# Patient Record
Sex: MALE | Race: WHITE | Employment: UNEMPLOYED | URBAN - NONMETROPOLITAN AREA
[De-identification: names, ages, dates, MRNs, and addresses within clinical notes are randomized per-mention and may not be internally consistent; named-entity substitution may affect disease eponyms.]

---

## 2019-04-22 ENCOUNTER — HOSPITAL ENCOUNTER (EMERGENCY)
Age: 54
Discharge: HOME | DRG: 342 | End: 2019-04-22
Payer: COMMERCIAL

## 2019-04-22 ENCOUNTER — APPOINTMENT (OUTPATIENT)
Dept: GENERAL RADIOLOGY | Age: 54
DRG: 342 | End: 2019-04-22
Payer: COMMERCIAL

## 2019-04-22 ENCOUNTER — HOSPITAL ENCOUNTER (INPATIENT)
Age: 54
LOS: 1 days | Discharge: HOME | DRG: 342 | End: 2019-04-23
Attending: EMERGENCY MEDICINE | Admitting: INTERNAL MEDICINE
Payer: COMMERCIAL

## 2019-04-22 ENCOUNTER — APPOINTMENT (OUTPATIENT)
Dept: CT IMAGING | Age: 54
DRG: 342 | End: 2019-04-22
Payer: COMMERCIAL

## 2019-04-22 VITALS
OXYGEN SATURATION: 95 % | BODY MASS INDEX: 43.23 KG/M2 | TEMPERATURE: 98.1 F | DIASTOLIC BLOOD PRESSURE: 72 MMHG | WEIGHT: 302 LBS | HEIGHT: 70 IN | SYSTOLIC BLOOD PRESSURE: 137 MMHG | HEART RATE: 105 BPM | RESPIRATION RATE: 22 BRPM

## 2019-04-22 DIAGNOSIS — R26.81 GAIT INSTABILITY: ICD-10-CM

## 2019-04-22 DIAGNOSIS — R46.0 POOR PERSONAL HYGIENE: ICD-10-CM

## 2019-04-22 DIAGNOSIS — S82.141A FRACTURE OF RIGHT TIBIAL PLATEAU, CLOSED, INITIAL ENCOUNTER: Primary | ICD-10-CM

## 2019-04-22 DIAGNOSIS — S82.141D CLOSED FRACTURE OF RIGHT TIBIAL PLATEAU WITH ROUTINE HEALING, SUBSEQUENT ENCOUNTER: Primary | ICD-10-CM

## 2019-04-22 PROBLEM — S82.143A TIBIAL PLATEAU FRACTURE, UNSPECIFIED LATERALITY, CLOSED, INITIAL ENCOUNTER: Status: ACTIVE | Noted: 2019-04-22

## 2019-04-22 LAB
A/G RATIO: 1.4 (ref 1.1–2.2)
ALBUMIN SERPL-MCNC: 4.1 G/DL (ref 3.4–5)
ALP BLD-CCNC: 79 U/L (ref 40–129)
ALT SERPL-CCNC: 22 U/L (ref 10–40)
ANION GAP SERPL CALCULATED.3IONS-SCNC: 13 MMOL/L (ref 3–16)
APTT: 29.6 SEC (ref 26–36)
AST SERPL-CCNC: 38 U/L (ref 15–37)
BASOPHILS ABSOLUTE: 0 K/UL (ref 0–0.2)
BASOPHILS RELATIVE PERCENT: 0.5 %
BILIRUB SERPL-MCNC: 0.8 MG/DL (ref 0–1)
BUN BLDV-MCNC: 25 MG/DL (ref 7–20)
CALCIUM SERPL-MCNC: 9.2 MG/DL (ref 8.3–10.6)
CARBAMAZEPINE DOSE: NORMAL
CARBAMAZEPINE LEVEL: 6.6 UG/ML (ref 4–12)
CHLORIDE BLD-SCNC: 96 MMOL/L (ref 99–110)
CO2: 25 MMOL/L (ref 21–32)
CREAT SERPL-MCNC: 1.1 MG/DL (ref 0.9–1.3)
EOSINOPHILS ABSOLUTE: 0 K/UL (ref 0–0.6)
EOSINOPHILS RELATIVE PERCENT: 0.4 %
GFR AFRICAN AMERICAN: >60
GFR NON-AFRICAN AMERICAN: >60
GLOBULIN: 3 G/DL
GLUCOSE BLD-MCNC: 113 MG/DL (ref 70–99)
HCT VFR BLD CALC: 45.4 % (ref 40.5–52.5)
HEMOGLOBIN: 15.3 G/DL (ref 13.5–17.5)
INR BLD: 1.09 (ref 0.86–1.14)
LYMPHOCYTES ABSOLUTE: 0.7 K/UL (ref 1–5.1)
LYMPHOCYTES RELATIVE PERCENT: 7.3 %
MCH RBC QN AUTO: 29.7 PG (ref 26–34)
MCHC RBC AUTO-ENTMCNC: 33.6 G/DL (ref 31–36)
MCV RBC AUTO: 88.4 FL (ref 80–100)
MONOCYTES ABSOLUTE: 0.9 K/UL (ref 0–1.3)
MONOCYTES RELATIVE PERCENT: 9.9 %
NEUTROPHILS ABSOLUTE: 7.6 K/UL (ref 1.7–7.7)
NEUTROPHILS RELATIVE PERCENT: 81.9 %
PDW BLD-RTO: 13.4 % (ref 12.4–15.4)
PLATELET # BLD: 226 K/UL (ref 135–450)
PMV BLD AUTO: 7.7 FL (ref 5–10.5)
POTASSIUM SERPL-SCNC: 3.8 MMOL/L (ref 3.5–5.1)
PROTHROMBIN TIME: 12.4 SEC (ref 9.8–13)
RBC # BLD: 5.14 M/UL (ref 4.2–5.9)
SODIUM BLD-SCNC: 134 MMOL/L (ref 136–145)
TOTAL PROTEIN: 7.1 G/DL (ref 6.4–8.2)
WBC # BLD: 9.3 K/UL (ref 4–11)

## 2019-04-22 PROCEDURE — 85025 COMPLETE CBC W/AUTO DIFF WBC: CPT

## 2019-04-22 PROCEDURE — 86900 BLOOD TYPING SEROLOGIC ABO: CPT

## 2019-04-22 PROCEDURE — 80053 COMPREHEN METABOLIC PANEL: CPT

## 2019-04-22 PROCEDURE — 85610 PROTHROMBIN TIME: CPT

## 2019-04-22 PROCEDURE — 80156 ASSAY CARBAMAZEPINE TOTAL: CPT

## 2019-04-22 PROCEDURE — 1200000000 HC SEMI PRIVATE

## 2019-04-22 PROCEDURE — L1830 KO IMMOB CANVAS LONG PRE OTS: HCPCS

## 2019-04-22 PROCEDURE — 99285 EMERGENCY DEPT VISIT HI MDM: CPT

## 2019-04-22 PROCEDURE — 6370000000 HC RX 637 (ALT 250 FOR IP): Performed by: PHYSICIAN ASSISTANT

## 2019-04-22 PROCEDURE — 73700 CT LOWER EXTREMITY W/O DYE: CPT

## 2019-04-22 PROCEDURE — 86850 RBC ANTIBODY SCREEN: CPT

## 2019-04-22 PROCEDURE — 99284 EMERGENCY DEPT VISIT MOD MDM: CPT

## 2019-04-22 PROCEDURE — 85730 THROMBOPLASTIN TIME PARTIAL: CPT

## 2019-04-22 PROCEDURE — 71045 X-RAY EXAM CHEST 1 VIEW: CPT

## 2019-04-22 PROCEDURE — 93005 ELECTROCARDIOGRAM TRACING: CPT | Performed by: PHYSICIAN ASSISTANT

## 2019-04-22 PROCEDURE — 73560 X-RAY EXAM OF KNEE 1 OR 2: CPT

## 2019-04-22 PROCEDURE — 86901 BLOOD TYPING SEROLOGIC RH(D): CPT

## 2019-04-22 RX ORDER — HYDROCODONE BITARTRATE AND ACETAMINOPHEN 5; 325 MG/1; MG/1
1 TABLET ORAL EVERY 6 HOURS PRN
Qty: 20 TABLET | Refills: 0 | Status: SHIPPED | OUTPATIENT
Start: 2019-04-22 | End: 2019-04-27

## 2019-04-22 RX ORDER — CARBAMAZEPINE 200 MG/1
200 TABLET ORAL ONCE
Status: COMPLETED | OUTPATIENT
Start: 2019-04-22 | End: 2019-04-23

## 2019-04-22 RX ORDER — ARIPIPRAZOLE 15 MG/1
15 TABLET ORAL DAILY
Status: DISCONTINUED | OUTPATIENT
Start: 2019-04-23 | End: 2019-04-23 | Stop reason: HOSPADM

## 2019-04-22 RX ORDER — CITALOPRAM 20 MG/1
20 TABLET ORAL ONCE
Status: COMPLETED | OUTPATIENT
Start: 2019-04-22 | End: 2019-04-23

## 2019-04-22 RX ORDER — LAMOTRIGINE 100 MG/1
200 TABLET ORAL DAILY
Status: DISCONTINUED | OUTPATIENT
Start: 2019-04-23 | End: 2019-04-23 | Stop reason: HOSPADM

## 2019-04-22 RX ORDER — HYDROCODONE BITARTRATE AND ACETAMINOPHEN 5; 325 MG/1; MG/1
1 TABLET ORAL ONCE
Status: COMPLETED | OUTPATIENT
Start: 2019-04-22 | End: 2019-04-22

## 2019-04-22 RX ADMIN — HYDROCODONE BITARTRATE AND ACETAMINOPHEN 1 TABLET: 5; 325 TABLET ORAL at 14:33

## 2019-04-22 ASSESSMENT — PAIN DESCRIPTION - FREQUENCY: FREQUENCY: INTERMITTENT

## 2019-04-22 ASSESSMENT — PAIN DESCRIPTION - LOCATION: LOCATION: KNEE

## 2019-04-22 ASSESSMENT — PAIN DESCRIPTION - ONSET: ONSET: SUDDEN

## 2019-04-22 ASSESSMENT — PAIN DESCRIPTION - PAIN TYPE: TYPE: ACUTE PAIN

## 2019-04-22 ASSESSMENT — ENCOUNTER SYMPTOMS
SHORTNESS OF BREATH: 0
COLOR CHANGE: 0

## 2019-04-22 ASSESSMENT — PAIN DESCRIPTION - ORIENTATION: ORIENTATION: RIGHT

## 2019-04-22 ASSESSMENT — PAIN SCALES - GENERAL
PAINLEVEL_OUTOF10: 10
PAINLEVEL_OUTOF10: 0

## 2019-04-22 NOTE — ED PROVIDER NOTES
Evaluated by FLORY supervising physician available for consult    Patient states he got up from bed Saturday morning 2 days ago stood up and twisted around and felt a pop in his right knee and then his knee gave out on him and he fell on the floor. States no other injuries from the fall did not hit his head. No loss of consciousness. States he has been trying to get around at home but putting pressure on his right knee causes pain and then it gives out on him a couple more times. Taking ibuprofen at home. States if he stays still he has no pain at all in knee only if he goes to move it or bearing weight causes pain. The history is provided by the patient. Knee Problem   Location:  Knee  Time since incident:  2 days  Injury: yes    Knee location:  R knee  Pain details:     Onset quality:  Sudden  Chronicity:  New  Relieved by:  Rest  Worsened by: Activity, bearing weight and flexion  Associated symptoms: no fever, no neck pain and no numbness        Review of Systems   Constitutional: Negative for fever. Respiratory: Negative for shortness of breath. Cardiovascular: Negative for chest pain. Musculoskeletal: Negative for neck pain. Rt knee pain   Skin: Negative for color change and wound. Neurological: Negative for dizziness, syncope, numbness and headaches. PAST MEDICAL HISTORY   has a past medical history of Allergic rhinitis and Depression. PAST SURGICAL HISTORY   has a past surgical history that includes Tonsillectomy. FAMILY HISTORY  family history includes Cancer in his mother; Heart Disease in his father and mother. SOCIAL HISTORY   reports that he has never smoked. His smokeless tobacco use includes snuff. He reports that he does not drink alcohol or use drugs. HOME MEDICATIONS     Prior to Admission medications    Medication Sig Start Date End Date Taking?  Authorizing Provider   CarBAMazepine (TEGRETOL PO) Take 800 mg by mouth   Yes Historical Provider, MD   citalopram (CELEXA) 20 MG tablet Take 20 mg by mouth daily   Yes Historical Provider, MD   ARIPiprazole (ABILIFY) 30 MG tablet Take 30 mg by mouth daily   Yes Historical Provider, MD   methylphenidate (RITALIN) 10 MG tablet Take 10 mg by mouth 3 times daily   Yes Historical Provider, MD   lamoTRIgine (LAMICTAL) 200 MG tablet Take 200 mg by mouth daily   Yes Historical Provider, MD        ALLERGIES  has No Known Allergies. /72   Pulse 105   Temp 98.1 °F (36.7 °C) (Oral)   Resp 22   Ht 5' 10\" (1.778 m)   Wt (!) 302 lb (137 kg)   SpO2 90%   BMI 43.33 kg/m²     Physical Exam   Constitutional: He is oriented to person, place, and time. He appears well-developed and well-nourished. Non-toxic appearance. He does not have a sickly appearance. He does not appear ill. HENT:   Head: Normocephalic and atraumatic. Cardiovascular: Normal rate, regular rhythm, normal heart sounds and intact distal pulses. Pulses:       Popliteal pulses are 2+ on the right side. Dorsalis pedis pulses are 2+ on the right side. Posterior tibial pulses are 2+ on the right side. Pulmonary/Chest: Effort normal and breath sounds normal. No stridor. No respiratory distress. He has no wheezes. He has no rales. Musculoskeletal:        Right hip: He exhibits no tenderness. Right knee: He exhibits decreased range of motion. He exhibits no ecchymosis, no deformity, no erythema, normal alignment and normal patellar mobility. Tenderness found. Medial joint line tenderness noted. Right ankle: He exhibits normal range of motion, no swelling, no ecchymosis, no deformity and normal pulse. No tenderness. Legs:       Right foot: There is normal range of motion, no tenderness, no swelling, normal capillary refill and no crepitus. Neurological: He is alert and oriented to person, place, and time. No sensory deficit. He exhibits normal muscle tone. Skin: Skin is warm and dry.    Psychiatric: He has a normal mood and affect. His behavior is normal.   Vitals reviewed. Procedures    MDM  Number of Diagnoses or Management Options  Fracture of right tibial plateau, closed, initial encounter:      Amount and/or Complexity of Data Reviewed  Tests in the radiology section of CPT®: ordered and reviewed  Independent visualization of images, tracings, or specimens: yes    Patient Progress  Patient progress: stable    Xr Knee Right (1-2 Views)    Result Date: 4/22/2019  EXAMINATION: 2 XRAY VIEWS OF THE RIGHT KNEE 4/22/2019 2:14 pm COMPARISON: None. HISTORY: ORDERING SYSTEM PROVIDED HISTORY: injury, pain TECHNOLOGIST PROVIDED HISTORY: Reason for exam:->injury, pain Ordering Physician Provided Reason for Exam: right knee pain after feeling pop in knee on saturday, no known injury Acuity: Acute Type of Exam: Initial Relevant Medical/Surgical History: best images due to pt ability to maneuver, multi attempts made for lateral image FINDINGS: There is an irregular lucent fracture line through the tibial plateau and proximal tibia. The fracture line is seen to extend into the intercondylar eminence. Findings are most compatible with a minimally displaced tibial plateau fracture. There is mild medial compartment joint space narrowing. No sizeable suprapatellar knee joint effusion is noted. There are no radiopaque foreign bodies. Radiographic findings most compatible with a minimally displaced tibial plateau fracture as described in body of report. Recommend further evaluation via dedicated noncontrast CT of the knee. Ct Knee Right Wo Contrast    Result Date: 4/22/2019  EXAMINATION: CT OF THE RIGHT KNEE WITHOUT CONTRAST 4/22/2019 3:15 pm TECHNIQUE: CT of the right knee was performed without the administration of intravenous contrast.  Multiplanar reformatted images are provided for review.  Dose modulation, iterative reconstruction, and/or weight based adjustment of the mA/kV was utilized to reduce the radiation dose to as low as reasonably achievable. COMPARISON: Right knee plain radiographs performed on 4/22/2019 HISTORY ORDERING SYSTEM PROVIDED HISTORY: FRACTURE, KNEE TECHNOLOGIST PROVIDED HISTORY: Ordering Physician Provided Reason for Exam: RT KNEE PAIN AFTER HEARING A POP, NO KNOWN INJURY, UNABLE TO WALK SINCE INCIDENT ON SATURDAY Acuity: Acute Type of Exam: Initial 67-year-old male who complains of right knee pain after hearing a pop; inability to walk since Saturday FINDINGS: Bones: There are vertically oriented fractures extending through the central tibial plateau/tibial spine. Comminuted, mildly depressed, intra-articular fracture through the posterior aspect of the lateral tibial plateau on image 35, series 602, with articular offset measuring up to 4 mm on image 34, series 602. This is also appreciated on image 55, series 601. These findings are also well appreciated on axial images 65-76, series 3. Proximal fibula appears grossly intact. Distal femur and patella appear intact. Mild enthesopathy at the upper and lower poles of the patella. Tiny bone islands within the medial femoral condyle. Soft Tissue: Atherosclerotic calcification of the vasculature. Moderate circumferential edema/fluid is seen within the subcutaneous fat about the right knee. Joint:  Small suprapatellar joint effusion/lipohemarthrosis. 1. Acute comminuted, vertically oriented fractures extending through the central tibial plateau and tibial spine. Comminuted, mildly depressed, intra-articular fracture through the posterolateral tibial plateau with articular offset measuring up to 4 mm. 2. Small suprapatellar joint effusion/lipohemarthrosis. 3. Atherosclerotic calcification of the vasculature. 4. Moderate edema in the subcutaneous fat about the right knee. 3:51 PM  Patient has acute comminuted tibial plateau fractures, mildly depressed, intra-articular posterior lateral tibial plateau fracture offset 4 mm.  He is neurovascularly intact and only mild swelling on exam. Plan to discharge home with knee immobilizer, crutches, non weight bearing, pain medication and follow up with orthopedics in office. I consulted Dr Harmony Munoz orthopedics on call who has reviewed films and he is in agreement with treatment plan as long as patient remains non weight bearing. I again emphasized nonweightbearing with the patient and discussed if he does not think he can remain nonweightbearing we can bring him into the hospital for management, patient declines admission, states he wants to go home, states he has no problem with non-weight bearing at home. Patient will call orthopedic office in the morning to arrange appointment to be seen this week. Advised keeping the leg elevated, ice packs and rest.  We discussed signs and symptoms of compartment syndrome and reasons to return to the ER for worsening pain, increased swelling, any numbness or discoloration returning immediately to the ER he understands and agrees. I estimate there is LOW risk for COMPARTMENT SYNDROME, TENDON OR NEUROVASCULAR INJURY, thus I consider the discharge disposition reasonable. Please note that this chart was generated using Dragon dictation software.  Although every effort was made to ensure the accuracy of this automated transcription, some errors in transcription may have occurred         Alpesh Mcdaniel PA-C  04/22/19 2596

## 2019-04-22 NOTE — ED NOTES
Pt DC home in good condition with RX x__1__ with knee immobilizer and crutches and follow up with ortho in the AM. V/u of Dc instructions. Denies questions or concerns. Teaching done re: s/s to report.      Ramona Christopher RN  04/22/19 1680

## 2019-04-22 NOTE — PROGRESS NOTES
Patient needs close follow-up, I have referred the patient to Dr. Parveen Juarez we will see the patient in the day or 2 for repeat clinical exam and x-rays. Tibial plateau, spine fracture. Previous patient of Dr. Parveen Juarez.

## 2019-04-22 NOTE — ED NOTES
Pain to R knee, no obvious deformities, circs intact and pulses present distal to site. Pt alert, without distress, given something to drink, call light within reach, will cot to monitor.       Mariano Wagoner RN  04/22/19 2129

## 2019-04-22 NOTE — ED NOTES
RN told me patient had difficulty when they tried him using crutches, I went back in the room and spoke with him to make sure he will be able to manage at home, he states it will just take some getting used to with crutches but assures me he will be able to use them at home and he again stated he does not want to be admitted to the hospital, he wants to go home and states he will not have any problem keeping his weight off his leg.       Sera Diaz PA-C  04/22/19 7414

## 2019-04-23 ENCOUNTER — COMMUNITY OUTREACH (OUTPATIENT)
Dept: OTHER | Age: 54
End: 2019-04-23

## 2019-04-23 VITALS
WEIGHT: 315 LBS | HEIGHT: 70 IN | OXYGEN SATURATION: 94 % | HEART RATE: 94 BPM | SYSTOLIC BLOOD PRESSURE: 156 MMHG | RESPIRATION RATE: 18 BRPM | DIASTOLIC BLOOD PRESSURE: 101 MMHG | TEMPERATURE: 97.2 F | BODY MASS INDEX: 45.1 KG/M2

## 2019-04-23 PROBLEM — E66.01 MORBID OBESITY WITH BMI OF 45.0-49.9, ADULT (HCC): Status: ACTIVE | Noted: 2019-04-23

## 2019-04-23 PROBLEM — G47.33 OSA (OBSTRUCTIVE SLEEP APNEA): Status: ACTIVE | Noted: 2019-04-23

## 2019-04-23 PROBLEM — S82.141A CLOSED FRACTURE OF RIGHT TIBIAL PLATEAU: Status: ACTIVE | Noted: 2019-04-22

## 2019-04-23 LAB
ABO/RH: NORMAL
ANION GAP SERPL CALCULATED.3IONS-SCNC: 14 MMOL/L (ref 3–16)
ANTIBODY SCREEN: NORMAL
BILIRUBIN URINE: ABNORMAL
BLOOD, URINE: NEGATIVE
BUN BLDV-MCNC: 21 MG/DL (ref 7–20)
CALCIUM SERPL-MCNC: 9 MG/DL (ref 8.3–10.6)
CHLORIDE BLD-SCNC: 95 MMOL/L (ref 99–110)
CLARITY: CLEAR
CO2: 25 MMOL/L (ref 21–32)
COLOR: ABNORMAL
CREAT SERPL-MCNC: 1 MG/DL (ref 0.9–1.3)
EKG ATRIAL RATE: 92 BPM
EKG DIAGNOSIS: NORMAL
EKG P AXIS: 38 DEGREES
EKG P-R INTERVAL: 160 MS
EKG Q-T INTERVAL: 352 MS
EKG QRS DURATION: 112 MS
EKG QTC CALCULATION (BAZETT): 435 MS
EKG R AXIS: 30 DEGREES
EKG T AXIS: 46 DEGREES
EKG VENTRICULAR RATE: 92 BPM
GFR AFRICAN AMERICAN: >60
GFR NON-AFRICAN AMERICAN: >60
GLUCOSE BLD-MCNC: 100 MG/DL (ref 70–99)
GLUCOSE URINE: NEGATIVE MG/DL
KETONES, URINE: 15 MG/DL
LEUKOCYTE ESTERASE, URINE: NEGATIVE
MICROSCOPIC EXAMINATION: ABNORMAL
NITRITE, URINE: NEGATIVE
PH UA: 6 (ref 5–8)
POTASSIUM REFLEX MAGNESIUM: 4.1 MMOL/L (ref 3.5–5.1)
PROTEIN UA: NEGATIVE MG/DL
SODIUM BLD-SCNC: 134 MMOL/L (ref 136–145)
SPECIFIC GRAVITY UA: 1.01 (ref 1–1.03)
URINE REFLEX TO CULTURE: ABNORMAL
URINE TYPE: ABNORMAL
UROBILINOGEN, URINE: 2 E.U./DL

## 2019-04-23 PROCEDURE — 6370000000 HC RX 637 (ALT 250 FOR IP): Performed by: PHYSICIAN ASSISTANT

## 2019-04-23 PROCEDURE — 80048 BASIC METABOLIC PNL TOTAL CA: CPT

## 2019-04-23 PROCEDURE — 99219 PR INITIAL OBSERVATION CARE/DAY 50 MINUTES: CPT | Performed by: INTERNAL MEDICINE

## 2019-04-23 PROCEDURE — 2580000003 HC RX 258: Performed by: INTERNAL MEDICINE

## 2019-04-23 PROCEDURE — 36415 COLL VENOUS BLD VENIPUNCTURE: CPT

## 2019-04-23 PROCEDURE — 81003 URINALYSIS AUTO W/O SCOPE: CPT

## 2019-04-23 PROCEDURE — 93010 ELECTROCARDIOGRAM REPORT: CPT | Performed by: INTERNAL MEDICINE

## 2019-04-23 PROCEDURE — APPSS30 APP SPLIT SHARED TIME 16-30 MINUTES: Performed by: PHYSICIAN ASSISTANT

## 2019-04-23 PROCEDURE — 6370000000 HC RX 637 (ALT 250 FOR IP): Performed by: INTERNAL MEDICINE

## 2019-04-23 PROCEDURE — G0378 HOSPITAL OBSERVATION PER HR: HCPCS

## 2019-04-23 PROCEDURE — 97116 GAIT TRAINING THERAPY: CPT

## 2019-04-23 PROCEDURE — 97530 THERAPEUTIC ACTIVITIES: CPT

## 2019-04-23 PROCEDURE — 97535 SELF CARE MNGMENT TRAINING: CPT

## 2019-04-23 PROCEDURE — 97161 PT EVAL LOW COMPLEX 20 MIN: CPT

## 2019-04-23 PROCEDURE — 97165 OT EVAL LOW COMPLEX 30 MIN: CPT

## 2019-04-23 PROCEDURE — 6360000002 HC RX W HCPCS: Performed by: INTERNAL MEDICINE

## 2019-04-23 PROCEDURE — 96372 THER/PROPH/DIAG INJ SC/IM: CPT

## 2019-04-23 RX ORDER — NICOTINE 21 MG/24HR
1 PATCH, TRANSDERMAL 24 HOURS TRANSDERMAL DAILY
Status: DISCONTINUED | OUTPATIENT
Start: 2019-04-23 | End: 2019-04-23 | Stop reason: HOSPADM

## 2019-04-23 RX ORDER — SODIUM CHLORIDE 0.9 % (FLUSH) 0.9 %
10 SYRINGE (ML) INJECTION PRN
Status: DISCONTINUED | OUTPATIENT
Start: 2019-04-23 | End: 2019-04-23 | Stop reason: HOSPADM

## 2019-04-23 RX ORDER — ACETAMINOPHEN 325 MG/1
650 TABLET ORAL EVERY 4 HOURS PRN
Status: DISCONTINUED | OUTPATIENT
Start: 2019-04-23 | End: 2019-04-23 | Stop reason: HOSPADM

## 2019-04-23 RX ORDER — ONDANSETRON 2 MG/ML
4 INJECTION INTRAMUSCULAR; INTRAVENOUS EVERY 6 HOURS PRN
Status: DISCONTINUED | OUTPATIENT
Start: 2019-04-23 | End: 2019-04-23 | Stop reason: HOSPADM

## 2019-04-23 RX ORDER — MORPHINE SULFATE 4 MG/ML
2 INJECTION, SOLUTION INTRAMUSCULAR; INTRAVENOUS
Status: DISCONTINUED | OUTPATIENT
Start: 2019-04-23 | End: 2019-04-23 | Stop reason: HOSPADM

## 2019-04-23 RX ORDER — METHYLPHENIDATE HYDROCHLORIDE 10 MG/1
10 TABLET ORAL 3 TIMES DAILY
Status: DISCONTINUED | OUTPATIENT
Start: 2019-04-23 | End: 2019-04-23 | Stop reason: HOSPADM

## 2019-04-23 RX ORDER — CITALOPRAM 20 MG/1
20 TABLET ORAL DAILY
Status: DISCONTINUED | OUTPATIENT
Start: 2019-04-23 | End: 2019-04-23

## 2019-04-23 RX ORDER — CITALOPRAM 20 MG/1
20 TABLET ORAL NIGHTLY
Status: DISCONTINUED | OUTPATIENT
Start: 2019-04-23 | End: 2019-04-23 | Stop reason: HOSPADM

## 2019-04-23 RX ORDER — SODIUM CHLORIDE 0.9 % (FLUSH) 0.9 %
10 SYRINGE (ML) INJECTION EVERY 12 HOURS SCHEDULED
Status: DISCONTINUED | OUTPATIENT
Start: 2019-04-23 | End: 2019-04-23 | Stop reason: HOSPADM

## 2019-04-23 RX ORDER — CARBAMAZEPINE 200 MG/1
200 TABLET ORAL 4 TIMES DAILY
Status: DISCONTINUED | OUTPATIENT
Start: 2019-04-23 | End: 2019-04-23 | Stop reason: HOSPADM

## 2019-04-23 RX ORDER — HYDROCODONE BITARTRATE AND ACETAMINOPHEN 7.5; 325 MG/1; MG/1
1 TABLET ORAL EVERY 6 HOURS PRN
Status: DISCONTINUED | OUTPATIENT
Start: 2019-04-23 | End: 2019-04-23 | Stop reason: HOSPADM

## 2019-04-23 RX ADMIN — CARBAMAZEPINE 200 MG: 200 TABLET ORAL at 17:06

## 2019-04-23 RX ADMIN — METHYLPHENIDATE HYDROCHLORIDE 10 MG: 10 TABLET ORAL at 17:06

## 2019-04-23 RX ADMIN — LAMOTRIGINE 200 MG: 100 TABLET ORAL at 09:55

## 2019-04-23 RX ADMIN — ENOXAPARIN SODIUM 40 MG: 40 INJECTION SUBCUTANEOUS at 09:55

## 2019-04-23 RX ADMIN — ARIPIPRAZOLE 15 MG: 15 TABLET ORAL at 09:55

## 2019-04-23 RX ADMIN — Medication 10 ML: at 09:56

## 2019-04-23 RX ADMIN — CARBAMAZEPINE 200 MG: 200 TABLET ORAL at 14:30

## 2019-04-23 RX ADMIN — CARBAMAZEPINE 200 MG: 200 TABLET ORAL at 00:04

## 2019-04-23 RX ADMIN — METHYLPHENIDATE HYDROCHLORIDE 10 MG: 10 TABLET ORAL at 11:17

## 2019-04-23 RX ADMIN — CITALOPRAM HYDROBROMIDE 20 MG: 20 TABLET ORAL at 00:04

## 2019-04-23 RX ADMIN — CARBAMAZEPINE 200 MG: 200 TABLET ORAL at 09:55

## 2019-04-23 NOTE — ED NOTES
Bed: 04  Expected date:   Expected time:   Means of arrival:   Comments:  Jose Angel Harper  04/22/19 2130

## 2019-04-23 NOTE — ED PROVIDER NOTES
[address] ED  eMERGENCY dEPARTMENT eNCOUnter        Pt Name: Charity Bloch  MRN: [id number]  [place] 1965  Date of evaluation: 4/22/2019  Provider: Barbara Taylor PA-C  PCP: No primary care provider on file. This patient was seen and evaluated by the attending physician Norberto Spence, [address]       Chief Complaint   Patient presents with    Fall     Pt seen at [facility] today Dx with fractured right petela. Pt brought in by EMS as pt fell again this evening at home. Pt unable to ambulate with crutches and unable to care for self at home. Pt disheveled appearance and smells, states he has not been able to bathe in days and is suffering from depression. HISTORY OF PRESENT ILLNESS   (Location/Symptom, Timing/Onset, Context/Setting, Quality, Duration, Modifying Factors, Severity)  Note limiting factors. Charity Bloch is a 47 y.o. male patient presenting for evaluation of right knee pain. The patient states on Saturday approximate 5 AM he got up to get somewhat apparently sustained a twist and sudden pop involving the right knee. He fell to the ground at that time. The patient attempted to take care of himself remaining Saturday and Sunday. This morning Monday was seen in [place]. [facility]. He had x-ray of the right knee followed by CT scan confirming a comminuted central tibial plateau fracture. The patient was fitted with knee immobilizer and given crutches. The patient is obese and is experiencing extreme difficulty and maneuvering with the crutches and is followed twice since. He contacted EMS. Emergency physician Dr. Sukh White was at scene who recommended the patient be brought out to the facility for reevaluation, social service consult and admission. Nursing Notes were all reviewed and agreed with or any disagreements were addressed  in the HPI.     REVIEW OF SYSTEMS    (2-9 systems for level 4, 10 or more for level 5) member of club or organization: None     Attends meetings of clubs or organizations: None     Relationship status: None    Intimate partner violence:     Fear of current or ex partner: None     Emotionally abused: None     Physically abused: None     Forced sexual activity: None   Other Topics Concern    None   Social History Narrative    None       SCREENINGS             PHYSICAL EXAM    (up to 7 for level 4, 8 or more for level 5)     ED Triage Vitals [04/22/19 2140]   BP Temp Temp src Pulse Resp SpO2 Height Weight   (!) 152/89 98.7 °F (37.1 °C) -- 90 18 91 % 5' 10\" (1.778 m) 300 lb (136.1 kg)       Physical Exam   Constitutional: He is oriented to person, place, and time. He appears well-developed and well-nourished. Patient is morbidly obese with extremity poor hygiene. HENT:   Head: Normocephalic and atraumatic. Right Ear: External ear normal.   Left Ear: External ear normal.   Eyes: Conjunctivae are normal. Right eye exhibits no discharge. Left eye exhibits no discharge. No scleral icterus. Neck: Normal range of motion. Neck supple. Cardiovascular: Normal rate, regular rhythm and normal heart sounds. No murmur heard. Pulmonary/Chest: Effort normal and breath sounds normal. He exhibits no tenderness. Abdominal: Soft. Bowel sounds are normal. He exhibits no distension. There is no tenderness. Musculoskeletal: He exhibits edema and tenderness. Patient with very limited range of motion and is not attempted due to the swelling and pain about the right knee. He does have a strong DP pulse. He does wiggle toes. He does have sensation to the foot. Neurological: He is alert and oriented to person, place, and time. Skin: Skin is warm and dry. No rash noted. Psychiatric: He has a normal mood and affect. His behavior is normal. Judgment and thought content normal.   Nursing note and vitals reviewed.       DIAGNOSTIC RESULTS   LABS:    Labs Reviewed   CBC WITH AUTO DIFFERENTIAL - Abnormal; pm COMPARISON: None. HISTORY: ORDERING SYSTEM PROVIDED HISTORY: injury, pain TECHNOLOGIST PROVIDED HISTORY: Reason for exam:->injury, pain Ordering Physician Provided Reason for Exam: right knee pain after feeling pop in knee on saturday, no known injury Acuity: Acute Type of Exam: Initial Relevant Medical/Surgical History: best images due to pt ability to maneuver, multi attempts made for lateral image FINDINGS: There is an irregular lucent fracture line through the tibial plateau and proximal tibia. The fracture line is seen to extend into the intercondylar eminence. Findings are most compatible with a minimally displaced tibial plateau fracture. There is mild medial compartment joint space narrowing. No sizeable suprapatellar knee joint effusion is noted. There are no radiopaque foreign bodies. Radiographic findings most compatible with a minimally displaced tibial plateau fracture as described in body of report. Recommend further evaluation via dedicated noncontrast CT of the knee. Ct Knee Right Wo Contrast    Result Date: 4/22/2019  EXAMINATION: CT OF THE RIGHT KNEE WITHOUT CONTRAST 4/22/2019 3:15 pm TECHNIQUE: CT of the right knee was performed without the administration of intravenous contrast.  Multiplanar reformatted images are provided for review. Dose modulation, iterative reconstruction, and/or weight based adjustment of the mA/kV was utilized to reduce the radiation dose to as low as reasonably achievable. COMPARISON: Right knee plain radiographs performed on 4/22/2019 HISTORY ORDERING SYSTEM PROVIDED HISTORY: FRACTURE, KNEE TECHNOLOGIST PROVIDED HISTORY: Ordering Physician Provided Reason for Exam: RT KNEE PAIN AFTER HEARING A POP, NO KNOWN INJURY, UNABLE TO WALK SINCE INCIDENT ON SATURDAY Acuity: Acute Type of Exam: Initial 52-year-old male who complains of right knee pain after hearing a pop; inability to walk since Saturday FINDINGS: Bones:  There are vertically oriented fractures extending through the central tibial plateau/tibial spine. Comminuted, mildly depressed, intra-articular fracture through the posterior aspect of the lateral tibial plateau on image 35, series 602, with articular offset measuring up to 4 mm on image 34, series 602. This is also appreciated on image 55, series 601. These findings are also well appreciated on axial images 65-76, series 3. Proximal fibula appears grossly intact. Distal femur and patella appear intact. Mild enthesopathy at the upper and lower poles of the patella. Tiny bone islands within the medial femoral condyle. Soft Tissue: Atherosclerotic calcification of the vasculature. Moderate circumferential edema/fluid is seen within the subcutaneous fat about the right knee. Joint:  Small suprapatellar joint effusion/lipohemarthrosis. 1. Acute comminuted, vertically oriented fractures extending through the central tibial plateau and tibial spine. Comminuted, mildly depressed, intra-articular fracture through the posterolateral tibial plateau with articular offset measuring up to 4 mm. 2. Small suprapatellar joint effusion/lipohemarthrosis. 3. Atherosclerotic calcification of the vasculature. 4. Moderate edema in the subcutaneous fat about the right knee.          PROCEDURES   Unless otherwise noted below, none     Procedures    CRITICAL CARE TIME   N/A    CONSULTS:  IP CONSULT TO SOCIAL WORK  IP CONSULT TO ORTHOPEDIC SURGERY  IP CONSULT TO HOSPITALIST      EMERGENCY DEPARTMENT COURSE and DIFFERENTIALDIAGNOSIS/MDM:   Vitals:    Vitals:    04/22/19 2140   BP: (!) 152/89   Pulse: 90   Resp: 18   Temp: 98.7 °F (37.1 °C)   SpO2: 91%   Weight: 300 lb (136.1 kg)   Height: 5' 10\" (1.778 m)       Patient was given thefollowing medications:  Medications   carBAMazepine (TEGRETOL) tablet 200 mg (has no administration in time range)   ARIPiprazole (ABILIFY) tablet 15 mg (has no administration in time range)   citalopram (CELEXA) tablet 20 mg (has no administration in time range)   lamoTRIgine (LAMICTAL) tablet 200 mg (has no administration in time range)       The patient requires admission for inpatient management of a tibial plateau fracture right knee. Confirmed by x-ray and CT scan earlier today. Patient unable to ambulate with crutches as he is followed twice since attempting crutch use with the knee immobilizer in place. He has been brought up by EMS for this reason. At scene was emergency physician Dr. Jesusita Avitia who has asked that the patient be admitted for social service consult and orthopedic evaluation and further management. Evaluation patient is demonstrating extremity poor hygiene and is rather odorous. Admission laboratory studies including UA, EKG, portable chest x-ray will be obtained. Orthopedics will be consulted and hospitalist will be consulted. I did review the case with attending physician who also recommends admission. The patient is requesting his evening meds administered which includes Tegretol 200 mg, Abilify 15 mg, Celexa 20 mg and Lamictal 200 mg. I will place those orders. At 11 PM I did speak with Dr. Ace Sotomayor. He was aware of the case earlier conversation today. This is a former patient of Dr. Christian Green. He does indicate this will be managed nonoperatively. The patient will need to be admitted with strict elevation and observation of compartment syndrome. The patient may be placed in a rehab facility. At 11:46 PM I spoke with hospitalist and reviewed the case. The patient will be admitted. FINAL IMPRESSION      1. Closed fracture of right tibial plateau with routine healing, subsequent encounter    2. Poor personal hygiene    3. Gait instability          DISPOSITION/PLAN   DISPOSITION Decision To Admit 04/22/2019 10:17:11 PM      PATIENT REFERREDTO:  No follow-up provider specified.     DISCHARGE MEDICATIONS:  New Prescriptions    No medications on file       DISCONTINUED MEDICATIONS:  Discontinued Medications    No medications on file              (Please note that portions ofthis note were completed with a voice recognition program.  Efforts were made to edit the dictations but occasionally words are mis-transcribed. )    Caden Euceda PA-C (electronically signed)           Caden Euceda PA-C  04/22/19 9220

## 2019-04-23 NOTE — PROGRESS NOTES
Patient's cousin here to pick him up, request for transport placed to take patient out per wheelchair at this time.

## 2019-04-23 NOTE — CARE COORDINATION
Case Management Assessment  Initial Evaluation and discharge    Date/Time of Evaluation: 4/23/2019 10:45 AM  Assessment Completed by: Graeme Nix    Patient Name: Sailaja Braswell  YOB: 1965  Diagnosis: Tibial plateau fracture, unspecified laterality, closed, initial encounter Ion Cure  Date / Time: 4/22/2019  9:33 PM  Admission status/Date: Inpatient 4/22/2019  Chart Reviewed: Yes      Patient Interviewed: Yes   Family Interviewed:  No      Hospitalization in the last 30 days:  No    Contacts  :   Rafaela Jones or Keena Arceo  Relationship to Patient:  friends  Phone Number:  [phone]  Alternate Contact:     Relationship to Patient:     Phone Number:       Met with: patient    Current PCP: PCP referral in media to [facility] (pt resource advocate)    [address] required for SNF : Y      3 night stay required -  [employer]  Support Systems: None  Transportation: [place] or [place]    Meal Preparation: self    Housing  Home Environment: lives alone in a house  Steps: 2 steps  Plans to Return to Present Housing: Yes  Other Identified Issues: [address]  Currently active with [address] : No  Type of Home Care Services: None  Passport/Waiver : No  :                      Phone Number:    Passport/Waiver Services: NA          Durable Medical Equipment   DME Provider:   Equipment: Crutches  Walker__Cane__RTS__ BSC__Shower Chair__  02__ HHN__ CPAP__  BiPap__  Hospital Bed__ W/C___ Other__________      Has Home O2 in place on admit:  No  Informed of need to bring portable home O2 tank on day of discharge for nursing to connect prior to leaving:   No  Verbalized agreement/Understanding:   No    Community Service Affiliation  Dialysis:  No    · Name:  · Location  · Dialysis Schedule:  · Phone:   · Fax:     Outpatient PT/OT: No    Cancer Center: No     CHF Clinic: No     Pulmonary Rehab: No  Pain Clinic: No  Community Mental Health: No    Wound Clinic: No     Other:     DISCHARGE PLAN: Chart reviewed. Met with pt at bedside and explained the role of the CM. Plan: TBD. Pt is [place] and drives. Pt prefers to return home. Denies any HC needs. Would prefer to do OP PT for knee injury. Will need PT/OT eval and recs when appropriate. Await Ortho consult and recs. +CM following. Explained Case Management role/services. Addendum 4/23/19 4647: Pt has refused to go to SNF, pt refuses HHC. Pt states that he does want the Bariatric W/c and bariatric walker. Pt states that he wants [facility] for DME and he states that he does not want the wheelchair delivered until tomorrow to his home. Pt states that he plans to go home today. Pt states that he has transportation today. CM called Anny with [facility] and she states that she will order and have the wheelchair to pt's home tomorrow. Pt is aware that he is non-weightbearing status and encouraged pt to wait for it to be delivered today to the home and he refused and stated that he wanted it delivered to home tomorrow. CM also explained that pt would benefit from a SNF, as \"hopping\" would not be safe. Pt again refused SNF and HHC. Discharge timeout done at the bedside with Daneila Noel RN. All discharge needs and concerns addressed. Spo2 is 91% on RA.

## 2019-04-23 NOTE — CONSULTS
Department of Orthopedic Surgery  Physician Assistant   Consult Note        Reason for Consult:  Right knee pain  Requesting Physician: Cheryl Rich MD  Date of Service: 4/23/2019 11:48 AM    CHIEF COMPLAINT:  As Above    History Obtained From:  patient    HISTORY OF PRESENT ILLNESS:                The patient is a 47 y.o. male who presents with above chief complaint. Pt states he twisted his knee on Saturday and heard a pop then fell onto that knee as well. Crawled for two days before coming in. No prior injuries to the knee. No n/t in the lower leg. No hip pain. Past Medical History:        Diagnosis Date    Allergic rhinitis     Depression      Past Surgical History:        Procedure Laterality Date    TONSILLECTOMY           Medications Prior to Admission:   Prior to Admission medications    Medication Sig Start Date End Date Taking? Authorizing Provider   HYDROcodone-acetaminophen (NORCO) 5-325 MG per tablet Take 1 tablet by mouth every 6 hours as needed for Pain for up to 5 days. 4/22/19 4/27/19 Yes Vale Ayala PA-C   CarBAMazepine (TEGRETOL PO) Take 800 mg by mouth   Yes Historical Provider, MD   citalopram (CELEXA) 20 MG tablet Take 20 mg by mouth daily   Yes Historical Provider, MD   ARIPiprazole (ABILIFY) 30 MG tablet Take 30 mg by mouth daily   Yes Historical Provider, MD   methylphenidate (RITALIN) 10 MG tablet Take 10 mg by mouth 3 times daily   Yes Historical Provider, MD   lamoTRIgine (LAMICTAL) 200 MG tablet Take 200 mg by mouth daily   Yes Historical Provider, MD       Allergies:  Patient has no known allergies. Social History:    Tobacco:  reports that he has never smoked. His smokeless tobacco use includes snuff. Alcohol:  reports that he does not drink alcohol.    Illicit Drug: No  Family History:       Problem Relation Age of Onset    Heart Disease Mother     Cancer Mother     Heart Disease Father        REVIEW OF SYSTEMS:    CONSTITUTIONAL: negative  MUSCULOSKELETAL:  positive for  pain    PHYSICAL EXAM:    awake, alert, cooperative, no apparent distress, and appears stated age  MUSCULOSKELETAL:  there is no redness, warmth, or swelling of the joints  full range of motion noted  motor strength is 5 out of 5 all extremities bilaterally  tone is normal  with exception of  RIGHT KNEE:  redness absent  warmth present  swelling present  tenderness present and noted greater laterally than medially. No open wounds. Sensation intact to touch. Good distal pulses. range of motion limited due to pain. Moving foot and ankle. Foot warm and pink. DATA:    CBC:   Recent Labs     04/22/19  2305   WBC 9.3   HGB 15.3        BMP:    Recent Labs     04/22/19  2305 04/23/19  0530   * 134*   K 3.8 4.1   CL 96* 95*   CO2 25 25   BUN 25* 21*   CREATININE 1.1 1.0   GLUCOSE 113* 100*     INR:   Recent Labs     04/22/19 2305   INR 1.09       Radiology:   XR CHEST PORTABLE   Final Result   Very low lung volume study. No focal pulmonary consolidation. IMPRESSION/RECOMMENDATIONS:    Assessment: right tibial plateau fracture    Plan:  1) Will plan for f/u with Dr Bouchra Jones in [facility] ([facility]) as patient unwilling to travel to [address] [place] [address] for any f/u. Has not seen any prior [facility] before. Discussed with Dr Irina FRANCIS who said Dr Sue Francis preferred non-op tx at this time, NWB with immobilizer and early follow up. Patient understands these recommendations and will f/u with us in the next 3-5 days for recheck. Will engage PT today for eval and ambulation help. Thank you for the opportunity to consult on this patient. Jackson Ash     Radiographs and CT reviewed. Recommend non surgical management as the fracture is non displaced and joint congruity is preserved. There is a meta diaphyseal fracture line for which he  Needs to be non weight bearing.   As patient wishes to followup with Dr Bouchra Jones, I will sign off

## 2019-04-23 NOTE — PROGRESS NOTES
Shift assessment complete; see flow sheet. Scheduled medications administered; See MAR. A/O x4. Pt denies any needs at this time. Call light within reach, bed in low locked position, bed alarm on.

## 2019-04-23 NOTE — H&P
Combined Hospital Medicine History & Physical with Discharge Summary      PCP: No primary care provider on file. Date of Admission: 4/22/2019    Date of Service: Pt seen/examined on 4/23/2019    Chief Complaint:    Chief Complaint   Patient presents with    Fall     Pt seen at [facility] today Dx with fractured right petela. Pt brought in by EMS as pt fell again this evening at home. Pt unable to ambulate with crutches and unable to care for self at home. Pt disheveled appearance and smells, states he has not been able to bathe in days and is suffering from depression. History Of Present Illness: The patient is a 47 y.o. male with a PMH of Depression and morbid obesity who presented to the ED with complaint of fall and right knee pain. Pt states he was in the kitchen when he twisted his knee and heard a pop. Pt states he feel when his right knee popped. He did not lose consciousness or hit his head. He went to the ER the next day and was told he had a tibial plateau fracture. Pt was sent home with a brace, crutches and a referral to orthopedics. Pt states when he went home, he was ambulating with the crutches and was unable walk. He fell using the crutches and decided to come to the ER. Pt denied any chest pain or SOB. No numbness or tingling in his extremities. Past Medical History:        Diagnosis Date    Allergic rhinitis     Depression        Past Surgical History:        Procedure Laterality Date    TONSILLECTOMY         Medications Prior to Admission:    Prior to Admission medications    Medication Sig Start Date End Date Taking? Authorizing Provider   HYDROcodone-acetaminophen (NORCO) 5-325 MG per tablet Take 1 tablet by mouth every 6 hours as needed for Pain for up to 5 days.  4/22/19 4/27/19 Yes Lennox Char, PA-C   CarBAMazepine (TEGRETOL PO) Take 800 mg by mouth   Yes Historical Provider, MD   citalopram (CELEXA) 20 MG tablet Take 20 mg by mouth daily   Yes Historical Provider, MD ARIPiprazole (ABILIFY) 30 MG tablet Take 30 mg by mouth daily   Yes Historical Provider, MD   methylphenidate (RITALIN) 10 MG tablet Take 10 mg by mouth 3 times daily   Yes Historical Provider, MD   lamoTRIgine (LAMICTAL) 200 MG tablet Take 200 mg by mouth daily   Yes Historical Provider, MD       Allergies:  Patient has no known allergies. Social History:  The patient currently lives at home. TOBACCO:   reports that he has never smoked. His smokeless tobacco use includes snuff. ETOH:   reports that he does not drink alcohol. Family History:   Positive as follows:        Problem Relation Age of Onset    Heart Disease Mother     Cancer Mother     Heart Disease Father        REVIEW OF SYSTEMS:     Constitutional: Negative for fever   HENT: Negative for sore throat   Eyes: Negative for redness   Respiratory: Negative  for dyspnea, cough   Cardiovascular: Negative for chest pain   Gastrointestinal: Negative for vomiting, diarrhea   Genitourinary: Negative for hematuria   Musculoskeletal: + right knee arthralgias   Skin: Negative for rash   Neurological: Negative for syncope   Hematological: Negative for adenopathy   Psychiatric/Behavorial: Negative for anxiety    PHYSICAL EXAM:    BP (!) 156/101   Pulse 94   Temp 97.2 °F (36.2 °C) (Oral)   Resp 18   Ht 5' 10\" (1.778 m)   Wt (!) 319 lb 4.8 oz (144.8 kg)   SpO2 94%   BMI 45.81 kg/m²   Gen: No distress. Alert. Morbidly obese  male  Eyes: PERRL. No sclera icterus. No conjunctival injection. Neck:  Trachea midline. Resp: No accessory muscle use. No crackles. No wheezes. No rhonchi. On 1L NC  CV: Regular rate. Regular rhythm. No murmur. No rub. No edema. GI: Non-tender. Non-distended. No masses. No organomegaly. Normal bowel sounds. No hernia. Skin: Warm and dry. No nodule on exposed extremities. No rash on exposed extremities. M/S: No cyanosis. No joint deformity. No clubbing. Right knee with mild joint swelling, TTP  Neuro: Awake. findings most compatible with a minimally displaced tibial   plateau fracture as described in body of report.  Recommend further   evaluation via dedicated noncontrast CT of the knee. Lilo Worley have reviewed the chart on [facility] and personally interviewed and examined patient, reviewed the data (labs and imaging) and after discussion with my PA formulated the plan. Agree with note with the following edits. HPI:     60-year-old white male was admitted to the hospital for a right tibial plateau fracture. He came to the ER was sent home on crutches. He returned after fall. He was then admitted to the hospital. Orthopedic consultation is obtained. They recommend non-operative treatment. Physical therapy and occupational therapy saw the patient. They recommended SNF but patient refuses. PT instructed him on how to use crutches. I reviewed the patient's Past Medical History, Past Surgical History, Medications, and Allergies. Physical exam:    BP (!) 156/101   Pulse 94   Temp 97.2 °F (36.2 °C) (Oral)   Resp 18   Ht 5' 10\" (1.778 m)   Wt (!) 319 lb 4.8 oz (144.8 kg)   SpO2 94%   BMI 45.81 kg/m²     Gen: No distress. Alert. Eyes: PERRL. No sclera icterus. No conjunctival injection. ENT: No discharge. Pharynx clear. Neck: Trachea midline. Normal thyroid. Resp: No accessory muscle use. No crackles. No wheezes. No rhonchi. No dullness on percussion. CV: Regular rate. Regular rhythm. No murmur or rub. No edema. ASSESSMENT/PLAN:    Right Tibial Plateau Fracture  - Admit to Med Surg  - orthopedic surgery consulted - non-op mgmt at this time, NWB with immobilizer  - PT/OT - recommended SNF - pt refused SNF at this time, discussed risks of going home  - pt sent home with wheelchair and walker, cont PRN home pain meds  - plan for OP f/u with Dr. Jeraldene Halsted in 3-5 days. SHAMA  - reordered home bipap    Morbid Obesity  - Body mass index is 45.81 kg/m².   - Complicating assessment and treatment. Placing patient at risk for multiple co-morbidities as well as early death and contributing to the patient's presentation.   - Counseled on weight loss. Depression  - cont home regimen    DVT Prophylaxis: Lovenox  Diet: DIET GENERAL;  Code Status: Full Code    Medications at discharge:     Medication List      CONTINUE taking these medications    ARIPiprazole 30 MG tablet  Commonly known as:  ABILIFY     CELEXA 20 MG tablet  Generic drug:  citalopram     HYDROcodone-acetaminophen 5-325 MG per tablet  Commonly known as:  NORCO  Take 1 tablet by mouth every 6 hours as needed for Pain for up to 5 days. lamoTRIgine 200 MG tablet  Commonly known as:  LAMICTAL     RITALIN 10 MG tablet  Generic drug:  methylphenidate     TEGRETOL PO            Disposition: Discharged in stable condition to home. Recommended OP f/u in 1 week with PCP. Pt to follow up with orthopedic surgery in 3-5 days.       Severiano Frohlich PA-C 4:13 PM 4/23/2019    MEY SINGH 4/23/2019 5:07 PM

## 2019-04-23 NOTE — PROGRESS NOTES
PCA called me in because pt O2 was 81% on room air. Pt states he uses a cpap at home for sleep apnea, started on 2L O2 NC O2 now 91%.  Notified respiratory

## 2019-04-23 NOTE — PROGRESS NOTES
Handoff report and transfer of care given at bedside to [facility]. . Patient in stable condition, denies needs/concerns at this time. Call light within reach.

## 2019-04-23 NOTE — DISCHARGE SUMMARY
H&P was completed on the same day as discharge. See combined H&P with discharge summary from 4/23/2019.       Stephy Green 4/23/2019 5:00 PM

## 2019-04-23 NOTE — ED NOTES
Call placed to Ortho @ [phone] serve sent to hospitalist @ [facility]  04/22/19 [id number]

## 2019-04-23 NOTE — PROGRESS NOTES
Inpatient Occupational Therapy  Evaluation and Treatment    Unit: 2w  Date:  4/23/2019  Patient Name:    Jonel Johnson  Admitting diagnosis:  Tibial plateau fracture, unspecified laterality, closed, initial encounter [S82.143A]  Admit Date:  4/22/2019  Precautions/Restrictions/WB Status/ Lines/ Wounds/ Oxygen: Fall Risk, IV, R LE NWB, KI on when OOB    Treatment Time: 13:26-14:18  Treatment Number: 1   Billable Treatment Time: 38 minutes   Total Treatment Time:   53  minutes    Patient Goals for Therapy:  \" I want to go home \"    Discharge Recommendations: SNF -if patient declined, he will need 24/hr assist and HH OT  DME needs for discharge: if patient is going home, he will need a bariatric RW and W/C with elevating leg rests    If pt chooses to DC home against therapist's recommendation to go to SNF, he is recommended to have 24/7 assist (he admits he will NOT), he should have home PT/OT (which he is currently declining), and he will require a bariatric RW for transfers into a wheelchair with elevating leg rests. Pt requires the wheelchair to participate in all ADLs in customary locations of the house, including bathing and toileting in the bathroom; he is currently unable to do this as he is NWBing on RLE and only able to hop a short distance for transfers with the RW. The walker is not sufficient to travel a further distance due to safety concerns. Pt confirms he has room in his home for a wheelchair and he will use it on a regular basis. Pt has sufficient UE function and other mental capabilities needed to safely propel the wheelchair. Home Health S4 Level Recommendation:  Level 3 Safety  AM-PAC Score: 16    Preadmission Environment    Pt.  Lives Alone and 24/7 Assist not provided  Home environment:    one story home  Steps to enter first floor: 2Steps to enter and No Rail  Steps to second floor: N/A  Bathroom: Bath [address], Grab bars, Shower Chair  and [employer] owned: Shower Chair, BSC and Other: crutches     Preadmission Status:  Pt. Able to drive: Yes  Pt Fully independent with ADLs: Yes  Pt. Required assistance from family for: Independent PTA  Pt. Fully independent for transfers and gait and walked with No Device  History of falls Yes (two falls leading to admission)  Patient currently working to renovate home. Pain  No  Rating:NA  Location:   Pain Medicine Status: Denies need      Cognition    A&O x4   Able to follow 2 step commands    Subjective  Patient lying supine in bed with no family present  Pt agreeable to this OT eval & tx. Upper Extremity ROM:    WFL, pt able to perform all bed mobility, transfers, and gait without ROM limitation. Upper Extremity Strength:    WFL, pt able to perform all bed mobility, transfers, and gait without ROM limitation. Upper Extremity Sensation    WNL    Upper Extremity Proprioception:   WNL    Coordination and Tone  WNL    Balance  Static Sitting:  Good   Dynamic Sitting:  Good -   Static Standing: Fair   Dynamic Standing: Fair -    Bed mobility:    Supine to sit:   Supervision  Sit to supine:   Supervision  Scooting to head of bed: Independent  Scooting in sitting:  Supervision  Rolling:   Not Tested  Bridging:   Not Tested    Transfers:    Sit to stand:  Min A (patient is impulsive)   Stand to sit:  Min A  Bed to Chair:  Mod A with use of RW  Bed to [facility]:  Not Tested    Activity Tolerance   Pt completed therapy session with  pain and SOB noted w/activity. SpO2: 93% on 1L of O2. Doffed O2. SpO2 dropped to 88% with transfers. NC donned at end of session and RN notified   HR: 110s  BP:     Dressing:      UE:  Not Tested  LE:   Mod A  Bathing:    UE: Not Tested  LE: Not Tested  Eating:   Independent    Positioning Needs: Returned to bed, call light and needs in reach.    and Alarm Set    Exercise / Activities Initiated:   N/A    Patient/Family Education: Role of OT ; Recommendations for DC     Assessment of Deficits: Pt seen for Occupational therapy evaluation in acute care setting. Pt demonstrated decreased Activity Tolerance, ADL's, Balance, Bathing, Bed Mobility, Dressing, ROM, Safety Awareness, Strength and Transfers    Goal(s) : To be met in 3 Visits:  1). Indep with UE ex x 10 reps    To be met in 5 Visits:  1). Supine to Sit: Independent  2). Bed to Chair/BSC: SBA  3). Upper Body Bathing:  Supervision  4). Lower Body Bathing:  CGA  5). Upper Body Dressing: Independent  6). Lower Body Dressing: CGA  7). Pt to wanda UE exs [id number]    Rehabilitation Potential:  Good for goals listed above. Strengths for achieving goals include: Pt motivated, PLOF and Pt cooperative  Barriers to achieving goals include:  Complexity of condition     Plan: To be seen 5x / week  while in acute care setting for therapeutic exercises, bed mobility, transfers, dressing, bathing,family/patient education with adaptive equipment, breathing technique instruction.        Jama Bosworth, OTR/L #[id number]    If patient discharges from this facility prior to next visit, this note will serve as the Discharge Summary

## 2019-04-23 NOTE — PROGRESS NOTES
Right tibial plateau fracture  Morbid obesity   Ortho consult, pain control   Resume psych meds  Will likely need placement.

## 2019-04-23 NOTE — ED PROVIDER NOTES
ED Course as of Apr 23 1637 Mon Apr 22, 2019   2221 I have personally performed and/or participated in the history, exam and medical decision making and agree with all pertinent clinical information. I have also reviewed and agree with the past medical, family and social history unless otherwise noted. Patient presents for evaluation of inability to care for self. Left hip fracture at some point this weekend, delayed going to the ER and went today. He was placed in knee brace, sent home. He is unable to use crutches or take care of himself and lives alone. He'll need placement/surgical intervention. Labs and studies sent for preoperative planning, or they contacted, will admit to hospitalist    [WL]   97 70 84 Patient nontoxic in no acute distress. He is malodorous and disheveled. Abdomen is soft. He has tenderness over the right tibial plateau, unable to rotate leg. He has good distal function flexion and extension and ankle is normal.  PT and DP pulses are intact.   Sensation distally is intact    [WL]   2359 Carbamazepine Level, Total:    Carbamazepine Lvl 6.6   Carbamazepine Dose Unknown [WL]   Tue Apr 23, 2019   0000 Protime-INR:    Prothrombin Time 12.4   INR 1.09 [WL]   0000 APTT:    aPTT 29.6 [WL]   0000 CBC Auto Differential(!):    WBC 9.3   RBC 5.14   Hemoglobin Quant 15.3   Hematocrit 45.4   MCV 88.4   MCH 29.7   MCHC 33.6   RDW 13.4   Platelet Count 057   MPV 7.7   Neutrophils % 81.9   Lymphocyte % 7.3   Monocytes % 9.9   Eosinophils % 0.4   Basophils % 0.5   Neutrophils # 7.6   Lymphocytes # 0.7(!)   Monocytes # 0.9   Eosinophils # 0.0   Basophils # 0.0 [WL]   0000 Comprehensive Metabolic Panel(!):    Sodium 134(!)   Potassium 3.8   Chloride 96(!)   CO2 25   Anion Gap 13   Glucose 113(!)   BUN 25(!)   Creatinine 1.1   GFR Non- >60   GFR African American >60   Calcium 9.2   Total Protein 7.1   Albumin 4.1   Albumin/Globulin Ratio 1.4   Bilirubin 0.8   Alk Phos 79   ALT 22   AST 38(!)   Globulin 3.0 [WL]   0000 XR CHEST PORTABLE [WL]   0000 Sinus rhythm at 92. Normal axis. . No acute ST-T changes.    EKG 12 Lead [WL]      ED Course User Index  [WL] DO Jonathon Millan DO  04/23/19 7232

## 2019-04-23 NOTE — PLAN OF CARE
Problem: Falls - Risk of:  Goal: Will remain free from falls  Description  Will remain free from falls  Outcome: Ongoing  Goal: Absence of physical injury  Description  Absence of physical injury  Outcome: Ongoing     Problem: SAFETY  Goal: Free from accidental physical injury  Outcome: Ongoing  Goal: Free from intentional harm  Outcome: Ongoing     Problem: DAILY CARE  Goal: Daily care needs are met  Outcome: Ongoing     Problem: PAIN  Goal: Patient's pain/discomfort is manageable  Outcome: Ongoing     Problem: SKIN INTEGRITY  Goal: Skin integrity is maintained or improved  Outcome: Ongoing     Problem: KNOWLEDGE DEFICIT  Goal: Patient/S.O. demonstrates understanding of disease process, treatment plan, medications, and discharge instructions.   Outcome: Ongoing     Problem: DISCHARGE BARRIERS  Goal: Patient's continuum of care needs are met  Outcome: Ongoing     Problem: Mobility - Impaired:  Goal: Mobility will improve to maximum level  Description  Mobility will improve to maximum level  Outcome: Ongoing

## 2019-04-23 NOTE — PROGRESS NOTES
Inpatient Physical Therapy Evaluation and Treatment    Unit: 2w  Date:  4/23/2019  Patient Name:    Martin Villavicencio  Admitting diagnosis:  Tibial plateau fracture, unspecified laterality, closed, initial encounter [S82.143A]  Admit Date:  4/22/2019  Precautions/Restrictions/WB Status/ Lines/ Wounds/ Oxygen: Fall Risk, IV,  NWB RLE in [place]    Treatment Time:  5153-9428  Treatment Number:  1   Timed Code Treatment Minutes: 45 minutes  Total Treatment Minutes:  55  minutes    Patient Goals for Therapy: \" to go home \"        Discharge Recommendations: SNF  DME needs for discharge: Bariatric RW and W/C with elevating leg rests    If pt chooses to DC home against therapist's recommendation to go to SNF, he is recommended to have 24/7 assist (he admits he will NOT), he should have home PT/OT (which he is currently declining), and he will require a bariatric RW for transfers into a wheelchair with elevating leg rests. Pt requires the wheelchair to participate in all ADLs in customary locations of the house, including bathing and toileting in the bathroom; he is currently unable to do this as he is NWBing on RLE and only able to hop a short distance for transfers with the RW. The walker is not sufficient to travel a further distance due to safety concerns. Pt confirms he has room in his home for a wheelchair and he will use it on a regular basis. Pt has sufficient UE function and other mental capabilities needed to safely propel the wheelchair. Home Health S4 Level Recommendation:  Level 1 Standard  AM-PAC Mobility Score    AM-PAC Inpatient Mobility Raw Score : 14       Preadmission Environment    Pt.  Lives Alone and 24/7 Assist not provided (friends/neighbors able to check in on pt)  Home environment:  one story home  Steps to enter first floor: 2Steps to enter and No Rail  Steps to second floor: N/A  Bathroom: Bath [address], Grab bars, Shower Chair  and Standard height toilet  Equipment owned: Shower Chair, [facility] and Other: crutches    Preadmission Status:  Pt. Able to drive: Yes  Pt Fully independent with ADLs: Yes  Pt. Required assistance from family for: Independent PTA  Pt. Fully independent for transfers and gait and walked with No Device  History of falls Yes (two falls leading to admission)  Patient currently working to renovate home. Pain   No     Cognition    A&O x4   Able to follow 1 step commands   Required direct cueing to slow down and reduce impulsive behavior. Subjective  Patient lying supine in bed with no family present  Pt agreeable to this PT eval & tx. Upper Extremity ROM/Strength  Please see OT evaluation. Lower Extremity ROM / Strength    AROM WFL: Yes (for joints assessed); R knee in KI and not assessed at this time     Strength Assessment (measured on a 0-5 scale):  R LE   Quad   Not tested   Ant Tib  5/5   Hamstring Not tested   Iliopsoas Not tested  L LE  Quad   5/5   Ant Tib  5/5   Hamstring 5/5   Iliopsoas 5/5    Lower Extremity Sensation    WNL    Lower Extremity Proprioception:   WNL    Coordination and Tone  WNL    Balance  Static Sitting:  Good    Tolerance:  WNL  Dynamic Sitting:  Good   Static Standing: Fair    Tolerance: ~3 min per transfer  Dynamic Standing: Poor    Bed Mobility   Supine to Sit:   Modified Independent  Sit to Supine:  Modified Independent  Rolling:   Not Tested  Scooting at EOB: Independent  Scooting to [facility]:  Independent    Transfer Training     Sit to stand:   Min A and VC for hand placement  Stand to sit:   Min A and VC for hand placement  Bed to Chair:  Mod A (to mod A x2) with use of RW (see gait)    Gait gait completed as indicated below  Distance:  3 ft x2  Deviations (firm surface/linoleum): decreased sandee, Decreased step length, Decreased step height and hop-to pattern   Assistive Device Used:  RW  Level of Assist: Mod A of 2  Comment:     Stair Training deferred, pt unsafe/not appropriate to complete stairs at this time    Activity Tolerance   Pt

## 2019-04-24 ENCOUNTER — TELEPHONE (OUTPATIENT)
Dept: INTERNAL MEDICINE CLINIC | Age: 54
End: 2019-04-24

## 2019-04-24 NOTE — CARE COORDINATION
TC from pt requesting that we cancel his order for bariatric WC because it will not fit in his house. TC to Jt spoke with Wilfred Smith and he will cancel the order.

## 2019-04-24 NOTE — TELEPHONE ENCOUNTER
Pt informed. ----- Message from George Sabillon MD sent at 4/24/2019 11:38 AM EDT -----  Contact: [id number] [facility]  I not sure/it was ordered by discharge planning. They need to call [phone]/we are not responsible for it.  ----- Message -----  From: Mariella Mazariegos  Sent: 4/24/2019   9:49 AM  To: George Sabillon MD    [id number] [address] called and said you had a DME order for pt for a wheelchair but they need to know which company it was sent to because the pt is trying to cancel it. She is requesting for you to call pt back to let him know which company it was sent to, at [phone].

## 2019-04-30 ENCOUNTER — OFFICE VISIT (OUTPATIENT)
Dept: ORTHOPEDIC SURGERY | Age: 54
End: 2019-04-30
Payer: COMMERCIAL

## 2019-04-30 VITALS — HEIGHT: 70 IN | WEIGHT: 315 LBS | BODY MASS INDEX: 45.1 KG/M2

## 2019-04-30 DIAGNOSIS — S82.141A CLOSED FRACTURE OF RIGHT TIBIAL PLATEAU, INITIAL ENCOUNTER: Primary | ICD-10-CM

## 2019-04-30 PROCEDURE — G8427 DOCREV CUR MEDS BY ELIG CLIN: HCPCS | Performed by: ORTHOPAEDIC SURGERY

## 2019-04-30 PROCEDURE — 1111F DSCHRG MED/CURRENT MED MERGE: CPT | Performed by: ORTHOPAEDIC SURGERY

## 2019-04-30 PROCEDURE — L1832 KO ADJ JNT POS R SUP PRE CST: HCPCS | Performed by: ORTHOPAEDIC SURGERY

## 2019-04-30 PROCEDURE — 99203 OFFICE O/P NEW LOW 30 MIN: CPT | Performed by: ORTHOPAEDIC SURGERY

## 2019-04-30 PROCEDURE — 4004F PT TOBACCO SCREEN RCVD TLK: CPT | Performed by: ORTHOPAEDIC SURGERY

## 2019-04-30 PROCEDURE — G8417 CALC BMI ABV UP PARAM F/U: HCPCS | Performed by: ORTHOPAEDIC SURGERY

## 2019-04-30 PROCEDURE — 3017F COLORECTAL CA SCREEN DOC REV: CPT | Performed by: ORTHOPAEDIC SURGERY

## 2019-04-30 NOTE — PROGRESS NOTES
I am evaluating this patient as a consult at the request of ER      Chief Complaint:  New Patient (R KNEE INJURY 4/20/19 WHEN HE TWISTED WRONG FELT A POP AND FELL ON HIS R KNEE, SEEN AT ER 4/22/19  GIVEN [address] BUT MAKES HIM FEEL LOOPY )      History of Present Illness:  Dian Hudson is a 47 y.o. male here regarding right knee injury, twisted wrong and felt a pop in the knee and fell, was evaluated in the emergency room 2 days later diagnosed with a tibial plateau fracture, was placed in a knee immobilizer and provided a walker. He has been nonweightbearing since the injury. He lives alone and is doing his best to maintain nonweightbearing status. Currently has 7 out of 10 discomfort throughout the knee. Has significant bruising from the knee immobilizer and injury. He is a physicist, currently unemployed and looking for work.      Pain Assessment:  Pain Assessment  Location of Pain: Knee  Location Modifiers: Right  Severity of Pain: 7  Quality of Pain: Aching, Dull  Duration of Pain: Persistent  Frequency of Pain: Constant  Aggravating Factors: Bending, Stretching, Exercise, Kneeling, Squatting, Standing, Stairs, Walking, Straightening  Limiting Behavior: Yes  Result of Injury: No  Work-Related Injury: No  Are there other pain locations you wish to document?: No    Medical History:  Past Medical History:   Diagnosis Date    Allergic rhinitis     Depression      Past Surgical History:   Procedure Laterality Date    TONSILLECTOMY       Social History     Socioeconomic History    Marital status:      Spouse name: None    Number of children: None    Years of education: None    Highest education level: None   Occupational History    None   Social Needs    Financial resource strain: None    Food insecurity:     Worry: None     Inability: None    Transportation needs:     Medical: None     Non-medical: None   Tobacco Use    Smoking status: Never Smoker    Smokeless tobacco: Current User Types: Snuff   Substance and Sexual Activity    Alcohol use: No    Drug use: No    Sexual activity: Yes     Partners: Female   Lifestyle    Physical activity:     Days per week: None     Minutes per session: None    Stress: None   Relationships    Social connections:     Talks on phone: None     Gets together: None     Attends Islam service: None     Active member of club or organization: None     Attends meetings of clubs or organizations: None     Relationship status: None    Intimate partner violence:     Fear of current or ex partner: None     Emotionally abused: None     Physically abused: None     Forced sexual activity: None   Other Topics Concern    None   Social History Narrative    None     No Known Allergies    Review of Systems:  Constitutional: negative  Respiratory: negative  Cardiovascular: negative  Musculoskeletal:negative except for New Patient (R KNEE INJURY 4/20/19 WHEN HE TWISTED WRONG FELT A POP AND FELL ON HIS R KNEE, SEEN AT ER 4/22/19  GIVEN [address] )    Relevant review of systems reviewed and available in the patient's chart in media tab    Vital Signs:  Vitals:    04/30/19 1420   Weight: (!) 319 lb 3.6 oz (144.8 kg)   Height: 5' 10\" (1.778 m)         General Exam:   Constitutional: Patient is adequately groomed with no evidence of malnutrition  Mental Status: The patient is oriented to time, place and person. The patient's mood and affect are appropriate. Vascular: Examination reveals no swelling or calf tenderness. Peripheral pulses are palpable and 2+. right Knee Examination  Inspection:   No gross deformities noted. mild swelling noted. No erythema. He has ecchymoses throughout the right thigh. Skin is intact.     Palpation: negative Tenderness to palpation along the medial joint line, positive Tenderness to palpation along lateral joint line, tenderness to palpation along the lateral tibial plateau,negative Tenderness to palpation along medial and lateral facets of undersurface of the patella    Range of Motion:  0-90 no evidence of catching or locking    Strength:  Able to do SLR    Special Tests:  Deferred. Skin: There are no rashes, ulcerations or lesions. Gait: Nonweightbearing, currently wheelchair    Reflex: not tested    Additional Examinations:  Left Lower Extremity: Examination of the left lower extremity does not show any tenderness, deformity or injury. Range of motion is unremarkable. There is no gross instability. There are no rashes, ulcerations or lesions. Strength and tone are normal.      LUMBAR SPINE: The skin is warm and dry. There is no swelling, warmth, or erythema. Range of motion is within normal limits. There is no paraspinal or spinous process tenderness. Ipsilateral and contralateral straight leg raising tests are negative. The distal neurovascular exam is grossly intact and symmetric. X-RAYS: 3 views AP, lateral. and sunrise of the right knee were reviewed, they show no periosteal reaction, medullary lesions, or osteopenia. Evidence of nondisplaced lateral tibial plateau fracture     CT scan also reviewed from ED 4/22 and shows:  Right tibial plateau fracture with comminution, minimal disruption of the joint line, some comminution posterior lateral is 4 mm displaced at its worst    Assessment :  Right tibial plateau fracture    Impression:  Encounter Diagnosis   Name Primary?  Closed fracture of right tibial plateau, initial encounter Yes       Office Procedures:  Orders Placed This Encounter   Procedures    Breg T Scope Knee Brace     Patient was prescribed a Breg T-Scope Brace. The right knee will require stabilization / immobilization from this semi-rigid / rigid orthosis to improve their function. The orthosis will assist in protecting the affected area, provide functional support and facilitate healing.     The prefabricated orthosis was modified in the following manner to provide a customizable fit for the patient at the time of delivery. 1.  Identification of appropriate positioning and alignment of anatomical landmarks. 2.  Trimming of straps and adjustment of frame to specifically fit patient. 3.  Polycentric hinge adjustment in flexion and extension. The patient was educated and fit by a healthcare professional with expert knowledge and specialization in brace application while under the direct supervision of the treating physician. Verbal and written instructions for the use of and application of this item were provided. They were instructed to contact the office immediately should the brace result in increased pain, decreased sensation, increased swelling or worsening of the condition. Procedures    Breg T Scope Knee Brace     Patient was prescribed a Breg T-Scope Brace. The right knee will require stabilization / immobilization from this semi-rigid / rigid orthosis to improve their function. The orthosis will assist in protecting the affected area, provide functional support and facilitate healing. The prefabricated orthosis was modified in the following manner to provide a customizable fit for the patient at the time of delivery. 1.  Identification of appropriate positioning and alignment of anatomical landmarks. 2.  Trimming of straps and adjustment of frame to specifically fit patient. 3.  Polycentric hinge adjustment in flexion and extension. The patient was educated and fit by a healthcare professional with expert knowledge and specialization in brace application while under the direct supervision of the treating physician. Verbal and written instructions for the use of and application of this item were provided. They were instructed to contact the office immediately should the brace result in increased pain, decreased sensation, increased swelling or worsening of the condition.          Treatment Plan:  I reviewed his x-ray and CT findings, he does have a tibial plateau fracture with 4mm posterior lateral displacement and comminution. We discussed both surgical and conservative treatment options. Patient understands with and without surgery he will be a very high risk for osteoarthritis, my recommendations at this time are to proceed with conservative treatment, T scope brace unlocked 0-90° and nonweightbearing. Patient agrees with the plan, he does not wish to pursue surgical intervention at this time. He'll follow up in 1 month for AP and lateral x-ray of the right knee. Patient agrees with this plan, all of their questions were answered best of our ability and to their satisfaction.         Abel Sánchez

## 2019-05-07 ENCOUNTER — TELEPHONE (OUTPATIENT)
Dept: ORTHOPEDIC SURGERY | Age: 54
End: 2019-05-07

## 2019-05-07 NOTE — TELEPHONE ENCOUNTER
5/7/19  [facility] [place]  - APPROVED #[id number] - 4/30/19 TO 7/30/19 - PER FAX FROM Saleem Galvez RN @ [facility]

## 2019-06-04 ENCOUNTER — OFFICE VISIT (OUTPATIENT)
Dept: ORTHOPEDIC SURGERY | Age: 54
End: 2019-06-04
Payer: COMMERCIAL

## 2019-06-04 VITALS — HEIGHT: 70 IN | BODY MASS INDEX: 45.1 KG/M2 | WEIGHT: 315 LBS

## 2019-06-04 DIAGNOSIS — S82.141A CLOSED FRACTURE OF RIGHT TIBIAL PLATEAU, INITIAL ENCOUNTER: Primary | ICD-10-CM

## 2019-06-04 PROCEDURE — G8427 DOCREV CUR MEDS BY ELIG CLIN: HCPCS | Performed by: ORTHOPAEDIC SURGERY

## 2019-06-04 PROCEDURE — 3017F COLORECTAL CA SCREEN DOC REV: CPT | Performed by: ORTHOPAEDIC SURGERY

## 2019-06-04 PROCEDURE — 99213 OFFICE O/P EST LOW 20 MIN: CPT | Performed by: ORTHOPAEDIC SURGERY

## 2019-06-04 PROCEDURE — 4004F PT TOBACCO SCREEN RCVD TLK: CPT | Performed by: ORTHOPAEDIC SURGERY

## 2019-06-04 PROCEDURE — G8417 CALC BMI ABV UP PARAM F/U: HCPCS | Performed by: ORTHOPAEDIC SURGERY

## 2019-06-04 NOTE — PROGRESS NOTES
Chief Complaint:  Follow-up (CK R KNEE: Right tibial plateau fracture)      SUBJECTIVE:  Angie Mercedes is a 49 y.o. male who returns today for reevaluation and repeat x-ray of the right knee, sustained a nondisplaced tibial plateau fracture with been treating conservatively. He currently has 4 out of 10 discomfort. He is currently in a wheelchair, states he crawls around his house as it is small, occasionally uses walker and crutches. Pain Assessment:  Pain Assessment  Location of Pain: Knee  Location Modifiers: Right  Severity of Pain: 4  Quality of Pain: Aching  Duration of Pain: Persistent  Frequency of Pain: Constant  Aggravating Factors: Walking, Stairs, Standing, Squatting, Kneeling, Straightening, Exercise, Stretching, Bending  Limiting Behavior: Yes  Result of Injury: No  Work-Related Injury: No  Are there other pain locations you wish to document?: No      Medical History:  Patient's medications, allergies, past medical, surgical, social and family histories were reviewed and updated as appropriate. Review of Systems:  Constitutional: negative  Respiratory: negative  Cardiovascular: negative  Musculoskeletal:negative except for Follow-up (CK R KNEE: Right tibial plateau fracture)    Relevant review of systems reviewed and available in the patient's chart in media tab    General Exam:   Constitutional: Patient is adequately groomed with no evidence of malnutrition  Mental Status: The patient is oriented to time, place and person. The patient's mood and affect are appropriate. Vascular: Examination reveals no swelling or calf tenderness. Peripheral pulses are palpable and 2+. OBJECTIVE:  Vital Signs:  Vitals:    06/04/19 1507   Weight: (!) 319 lb 3.6 oz (144.8 kg)   Height: 5' 10\" (1.778 m)       Appearance: alert, well appearing, and in no distress, oriented to person, place, and time and overweight.     Physical exam:   No tenderness to palpation to the medial or lateral tibial plateau, range of motion is 0-130°, is able to perform a straight leg raise, is not wearing T scope brace. X-RAYS: 2 views AP, lateral. of the right knee were reviewed, they show no periosteal reaction, medullary lesions, or osteopenia. Evidence of nondisplaced lateral tibial plateau fracture with routine healing    CT scan also reviewed from ED 4/22 and shows:  Right tibial plateau fracture with comminution, minimal disruption of the joint line, some comminution posterior lateral is 4 mm displaced at its worst    Assessment :  Right tibial plateau fracture    Impression:  Encounter Diagnosis   Name Primary?  Closed fracture of right tibial plateau, initial encounter Yes       Office Procedures:  Orders Placed This Encounter   Procedures    XR KNEE RIGHT (1-2 VIEWS)     Standing Status:   Future     Number of Occurrences:   1     Standing Expiration Date:   6/3/2020     No orders of the defined types were placed in this encounter. Treatment Plan:  He shows healing of right tibial plateau fracture, at this point he can begin 25% weightbearing with crutches or a walker, if no pain he can increase to 50% weightbearing in 2 weeks, continued no pain he can progress to full weightbearing in 1 month. He will follow up with me in 1 month for repeat x-ray AP and lateral of the right knee. Patient agrees with this plan, all of their questions were answered best of our ability and to their satisfaction.         Skip Casey

## 2019-07-09 ENCOUNTER — OFFICE VISIT (OUTPATIENT)
Dept: ORTHOPEDIC SURGERY | Age: 54
End: 2019-07-09
Payer: COMMERCIAL

## 2019-07-09 VITALS — WEIGHT: 315 LBS | HEIGHT: 70 IN | BODY MASS INDEX: 45.1 KG/M2

## 2019-07-09 DIAGNOSIS — S82.141A CLOSED FRACTURE OF RIGHT TIBIAL PLATEAU, INITIAL ENCOUNTER: Primary | ICD-10-CM

## 2019-07-09 PROCEDURE — 99213 OFFICE O/P EST LOW 20 MIN: CPT | Performed by: ORTHOPAEDIC SURGERY

## 2019-07-09 PROCEDURE — 3017F COLORECTAL CA SCREEN DOC REV: CPT | Performed by: ORTHOPAEDIC SURGERY

## 2019-07-09 PROCEDURE — 4004F PT TOBACCO SCREEN RCVD TLK: CPT | Performed by: ORTHOPAEDIC SURGERY

## 2019-07-09 PROCEDURE — E0100 CANE ADJUST/FIXED WITH TIP: HCPCS | Performed by: ORTHOPAEDIC SURGERY

## 2019-07-09 PROCEDURE — G8427 DOCREV CUR MEDS BY ELIG CLIN: HCPCS | Performed by: ORTHOPAEDIC SURGERY

## 2019-07-09 PROCEDURE — G8417 CALC BMI ABV UP PARAM F/U: HCPCS | Performed by: ORTHOPAEDIC SURGERY

## 2019-07-09 NOTE — PROGRESS NOTES
Chief Complaint:  Follow-up (CK R KNEE: Right tibial plateau fracture)      SUBJECTIVE:  Radha Whyte is a 49 y.o. male who returns today for reevaluation and repeat x-ray of the right knee, sustained a nondisplaced tibial plateau fracture with been treating conservatively. He currently has 3 out of 10 discomfort. Ambulating without any assistive devices at today's visit but states he still feels unsteady on the right knee feels as if it is bending backwards as he walks. Pain Assessment:  Pain Assessment  Location of Pain: Knee  Location Modifiers: Right  Severity of Pain: 3  Result of Injury: Yes  Work-Related Injury: No  Are there other pain locations you wish to document?: No      Medical History:  Patient's medications, allergies, past medical, surgical, social and family histories were reviewed and updated as appropriate. Review of Systems:  Constitutional: negative  Respiratory: negative  Cardiovascular: negative  Musculoskeletal:negative except for Follow-up (CK R KNEE: Right tibial plateau fracture)    Relevant review of systems reviewed and available in the patient's chart in media tab    General Exam:   Constitutional: Patient is adequately groomed with no evidence of malnutrition  Mental Status: The patient is oriented to time, place and person. The patient's mood and affect are appropriate. Vascular: Examination reveals no swelling or calf tenderness. Peripheral pulses are palpable and 2+. OBJECTIVE:  Vital Signs:  Vitals:    07/09/19 1437   Weight: (!) 319 lb 3.6 oz (144.8 kg)   Height: 5' 10\" (1.778 m)       Appearance: alert, well appearing, and in no distress, oriented to person, place, and time and overweight. Physical exam:   There is no tenderness along the medial or lateral tibial plateaus, he tolerates full range of motion 0 to 120 degrees, he is able to perform a straight leg raise and ambulates with a slightly unsteady gait.   There is no instability with anterior posterior drawer and varus and valgus stress. X-RAYS: 2 views AP, lateral. of the right knee were obtained and reviewed, they show no periosteal reaction, medullary lesions, or osteopenia. Continued evidence of nondisplaced lateral tibial plateau fracture with routine healing    CT scan also reviewed from ED 4/22 and shows:  Right tibial plateau fracture with comminution, minimal disruption of the joint line, some comminution posterior lateral is 4 mm displaced at its worst    Assessment :  Right tibial plateau fracture    Impression:  Encounter Diagnosis   Name Primary?  Closed fracture of right tibial plateau, initial encounter Yes       Office Procedures:  Orders Placed This Encounter   Procedures    XR KNEE RIGHT (1-2 VIEWS)     Standing Status:   Future     Number of Occurrences:   1     Standing Expiration Date:   7/8/2020   Kel Henderson     Patient was prescribed a Medline Cane. This mobility device is required for the following reasons:    Patient has mobility limitations that significantly impair their ability to participate in one or more mobility related activities of daily living. The patient is able to safely use the mobility device. Functional mobility deficit can be sufficiently resolved with the use of this device. Verbal and written instructions for the use of and application of this item were provided. The patient was educated and fit by a healthcare professional with expert knowledge and specialization in brace application while under the direct supervision of the treating physician. They were instructed to contact the office immediately should the equipment result in increased pain, decreased sensation, increased swelling or worsening of the condition. Procedures    Cane Medline     Patient was prescribed a Medline Cane.   This mobility device is required for the following reasons:    Patient has mobility limitations that significantly impair their ability to participate in one or more mobility related activities of daily living. The patient is able to safely use the mobility device. Functional mobility deficit can be sufficiently resolved with the use of this device. Verbal and written instructions for the use of and application of this item were provided. The patient was educated and fit by a healthcare professional with expert knowledge and specialization in brace application while under the direct supervision of the treating physician. They were instructed to contact the office immediately should the equipment result in increased pain, decreased sensation, increased swelling or worsening of the condition. Treatment Plan:  He needs to show healing of right tibial plateau fracture, at this point he can continue to be weightbearing as tolerated, we provided him with a home exercise program and cane. He will follow-up as needed. patient agrees with this plan, all of their questions were answered best of our ability and to their satisfaction.         Osvaldo Hernadez

## 2020-02-07 NOTE — PROGRESS NOTES
Patient given discharge instructions at this time, denies any needs prior to discharge. Patient provided bariatric walker to use at home. Reinforced that patient is non-weight bearing on his RLE, he states understanding. Patient called his ride to come pick him up. PAST MEDICAL HISTORY:  H/O tricuspid valve disorder     Neuroendocrine cancer